# Patient Record
(demographics unavailable — no encounter records)

---

## 2024-10-14 NOTE — CONSULT LETTER
[Dear  ___] : Dear  [unfilled], [Consult Letter:] : I had the pleasure of evaluating your patient, [unfilled]. [Please see my note below.] : Please see my note below. [Consult Closing:] : Thank you very much for allowing me to participate in the care of this patient.  If you have any questions, please do not hesitate to contact me. [Sincerely,] : Sincerely, [DrRishi  ___] : Dr. NOONAN [FreeTextEntry2] : Julia Unger MD [FreeTextEntry3] : Richard B. Libman, MD, FRCPC  , Neurology  Co-Director, Stroke Center Professor of Neurology [facility] [employer] at [facility]

## 2024-10-14 NOTE — DISCUSSION/SUMMARY
[FreeTextEntry1] : 10/14/24.  Her neurologic exam shows a left homonymous hemianopia which is a result of her right MCA infarct; left shoulder decreased range of motion and weakness due to orthopedic pathology; mild-moderate bilateral iliopsoas weakness, right weaker than left, and most likely due to deconditioning.  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  She presented to [facility] on 8/15/24 with "confusion" and a left facial palsy due to a moderate-sized right MCA infarct, consistent with a mechanism of embolic stroke of undetermined source. She presented to [facility] again on 10/2/24 with gait instability; CT head and CTA head and neck were stable and unremarkable.  The cause of this episode of gait instability remains uncertain as there was no evidence of recurrent stroke.  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  As per Dr. Ritchie, her previous echocardiograms demonstrated  a PFO and he is considering a MADDIE.  Given that she has had recurrent pulmonary embolism, I understand that she will likely be treated with indefinite anticoagulation (Eliquis).  If this is the case, then searching further for occult AF or for a PFO would likely not be fruitful as it is unlikely that management will be changed.  She is taking Eliquis for recurrent pulmonary embolism. While being treated for pulmonary embolism, there is no role for concomitant aspirin. I recommended she discontinue the Aspirin (unclear if she was actually receiving aspirin while in rehab).  She was told in the past that there was a clinical suspicion of sleep apnea.  She endorses snoring and daytime fatigue, again raising concern for obstructive sleep apnea. I have requested a home sleep study.  She can follow up in 3 months with Dopplers. I hope she remains free of further serious trouble.

## 2024-10-14 NOTE — PHYSICAL EXAM
[General Appearance - Alert] : alert [General Appearance - In No Acute Distress] : in no acute distress [Oriented To Time, Place, And Person] : oriented to person, place, and time [Impaired Insight] : insight and judgment were intact [Sclera] : the sclera and conjunctiva were normal [Extraocular Movements] : extraocular movements were intact [Outer Ear] : the ears and nose were normal in appearance [Examination Of The Oral Cavity] : the lips and gums were normal [Neck Appearance] : the appearance of the neck was normal [Auscultation Breath Sounds / Voice Sounds] : lungs were clear to auscultation bilaterally [Heart Rate And Rhythm] : heart rate was normal and rhythm regular [Heart Sounds] : normal S1 and S2 [Heart Sounds Gallop] : no gallops [Murmurs] : no murmurs [Heart Sounds Pericardial Friction Rub] : no pericardial rub [Arterial Pulses Carotid] : carotid pulses were normal with no bruits [Edema] : there was no peripheral edema [Veins - Varicosity Changes] : there were no varicosital changes [No CVA Tenderness] : no ~M costovertebral angle tenderness [No Spinal Tenderness] : no spinal tenderness [Abnormal Walk] : normal gait [Nail Clubbing] : no clubbing  or cyanosis of the fingernails [Musculoskeletal - Swelling] : no joint swelling seen [Motor Tone] : muscle strength and tone were normal [Skin Color & Pigmentation] : normal skin color and pigmentation [Skin Turgor] : normal skin turgor [] : no rash [FreeTextEntry1] : Generally looked well. Mental status exam: Alert, oriented x3, speech fluent/prosodic without paraphasias; comprehension intact; memory and fund of knowledge intact. On cranial nerve exam, there was a left homonymous hemianopia; the remainder of cranial nerves II through XII were intact. On motor exam tone was normal. There was no drift. Fine finger movements are mildly slow and clumsy bilaterally.  On manual motor testing: Left deltoid just barely antigravity; iliopsoas: Right 4 -/5; left 4/5 and otherwise power was normal throughout. Reflexes were 3+ in the arms and at the knees, 2+ at the right Achilles and 1+-2+ at the left Achilles.  There was a left Babinski sign and the right plantar reflex was downgoing.  Coordination in the limbs was intact.  Gait showed mildly diminished stride length and mild postural instability, but she could walk without assistance.  Tandem gait and Romberg were not tested.  On sensory exam, there was subtly diminished proprioception at the left toes with mild hesitancy in her responses but no errors.  Other sensory modalities were intact.

## 2024-10-14 NOTE — DISCUSSION/SUMMARY
[FreeTextEntry1] : 10/14/24.  Her neurologic exam shows a left homonymous hemianopia which is a result of her right MCA infarct; left shoulder decreased range of motion and weakness due to orthopedic pathology; mild-moderate bilateral iliopsoas weakness, right weaker than left, and most likely due to deconditioning.  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  She presented to [facility] on 8/15/24 with "confusion" and a left facial palsy due to a moderate-sized right MCA infarct, consistent with a mechanism of embolic stroke of undetermined source. She presented to [facility] again on 10/2/24 with gait instability; CT head and CTA head and neck were stable and unremarkable.  The cause of this episode of gait instability remains uncertain as there was no evidence of recurrent stroke.  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  As per Dr. Ritchie, her previous echocardiograms demonstrated  a PFO and he is considering a MADDIE.  Given that she has had recurrent pulmonary embolism, I understand that she will likely be treated with indefinite anticoagulation (Eliquis).  If this is the case, then searching further for occult AF or for a PFO would likely not be fruitful as it is unlikely that management will be changed.  She is taking Eliquis for recurrent pulmonary embolism. While being treated for pulmonary embolism, there is no role for concomitant aspirin. I recommended she discontinue the Aspirin (unclear if she was actually receiving aspirin while in rehab).  She was told in the past that there was a clinical suspicion of sleep apnea.  She endorses snoring and daytime fatigue, again raising concern for obstructive sleep apnea. I have requested a home sleep study.  She can follow up in 3 months with Dopplers. I hope she remains free of further serious trouble.

## 2024-10-14 NOTE — HISTORY OF PRESENT ILLNESS
[FreeTextEntry1] : She is a 70 year old lady.  She presented to [facility] on 8/15/24 with "confusion" and a left facial palsy   Workup at [facility] includes: MRI brain 8/16/24: To my eye, there is a moderate-sized, subacute right MCA infarct involving the temporal lobe and a smaller component involving the popdtev-othsmodqh-dxeaylck region.with petechial hemorrhages. There are moderate or moderate-severe periventricular white matter hyperintensities of presumed vascular origin CTA head and neck 8/15/24: Unremarkable. TTE 8/16/24: EF 59%.  CT chest/abdomen/pelvis 8/18/24: Multiple pancreatic cystic lesions. Some lesions appear unchanged from prior exam 3/2024, another lesion is not well visualized on prior. The remainder of the chest abdomen and pelvis is unremarkable.  10/14/24.  She came to the office today accompanied by her son.  From the stroke standpoint, she had been stable since hospitalization, but she presented to [facility] on 10/2/24 with gait instability; CT head and CTA head and neck were stable. She is currently admitted to [facility] rehab.   PCP Dr. Julia Unger Physician at [facility] rehab: Dr. Villasenor

## 2024-10-14 NOTE — CONSULT LETTER
[Dear  ___] : Dear  [unfilled], [Consult Letter:] : I had the pleasure of evaluating your patient, [unfilled]. [Please see my note below.] : Please see my note below. [Consult Closing:] : Thank you very much for allowing me to participate in the care of this patient.  If you have any questions, please do not hesitate to contact me. [Sincerely,] : Sincerely, [DrRishi  ___] : Dr. NOONAN [FreeTextEntry2] : Julia Unger MD [FreeTextEntry3] : Richard B. Libman, MD, FRCPC  , Neurology  Co-Director, Stroke Center Professor of Neurology [facility] [employer] at [facility]

## 2024-10-14 NOTE — HISTORY OF PRESENT ILLNESS
[FreeTextEntry1] : She is a 70 year old lady.  She presented to [facility] on 8/15/24 with "confusion" and a left facial palsy   Workup at [facility] includes: MRI brain 8/16/24: To my eye, there is a moderate-sized, subacute right MCA infarct involving the temporal lobe and a smaller component involving the dcgrbcx-aapdisiqg-adznqifl region.with petechial hemorrhages. There are moderate or moderate-severe periventricular white matter hyperintensities of presumed vascular origin CTA head and neck 8/15/24: Unremarkable. TTE 8/16/24: EF 59%.  CT chest/abdomen/pelvis 8/18/24: Multiple pancreatic cystic lesions. Some lesions appear unchanged from prior exam 3/2024, another lesion is not well visualized on prior. The remainder of the chest abdomen and pelvis is unremarkable.  10/14/24.  She came to the office today accompanied by her son.  From the stroke standpoint, she had been stable since hospitalization, but she presented to [facility] on 10/2/24 with gait instability; CT head and CTA head and neck were stable. She is currently admitted to [facility] rehab.   PCP Dr. Julia Unger Physician at [facility] rehab: Dr. Villasenor

## 2024-10-14 NOTE — REVIEW OF SYSTEMS
[As Noted in HPI] : as noted in HPI [Negative] : Heme/Lymph [FreeTextEntry2] : snoring +daytime fatigue

## 2024-10-16 NOTE — ASSESSMENT
[FreeTextEntry1] : JERAD STAUFFER is a 70 year woman with PMH of anxiety/depression, orthostatic hypotension, hypothyroidism, PE (provoked from COVID - 10/2023), who presents for Hematology follow up of IgG kappa monoclonal gammopathy and anemia.  # Anemia: Labs from 4/22/24 showed chronic anemia (Hgb 9.9, baseline 9-11). Hgb today is 9.7. She previously had low iron (ferritin 13) in 1/2024. She is off iron supplementation as she was unable to tolerate it due to constipation. - Labs: CBC, ferritin, iron studies - We discussed the benefits/risks of IV iron (Feraheme) and obtained consent. Will plan to treat pending above studies. - Patient should return to clinic in 3 months  # IgG kappa monoclonal gammopathy: Noted on neurologic evaluation in 5/2023 to have a weak IgG kappa monoclonal protein (M-spike unable to quantitate) and mildly elevated kappa/lambda ratio (1.95). Normal immunoglobulin levels and urine PAVAN. No evidence of renal dysfunction or hypercalcemia. Her labs appears consistent with MGUS, which we will monitor every 6-12 months for progression to multiple myeloma. - Labs: CBC, CMP, serum PAVAN, SPEP, quantitative immunoglobulins, free light chain assay

## 2024-10-16 NOTE — PHYSICAL EXAM
[Ambulatory and capable of all self care but unable to carry out any work activities] : Status 2- Ambulatory and capable of all self care but unable to carry out any work activities. Up and about more than 50% of waking hours [Normal] : grossly intact [de-identified] : in wheelchair.

## 2024-10-16 NOTE — HISTORY OF PRESENT ILLNESS
[de-identified] : JERAD STAUFFER is a 70 year woman with PMH of anxiety/depression, orthostatic hypotension, hypothyroidism, PE (provoked from COVID - 10/2023), who presents for Hematology follow up of IgG kappa monoclonal gammopathy and iron deficiency anemia.  She established care with Neurology in 5/2023 for evaluation of subacute amnestic symptoms, frequent falls and urinary urgency. As part of her work up, she had paraprotein markers collected, which revealed a weak IgG kappa monoclonal protein (M-spike unable to quantitate) and mildly elevated kappa/lambda ratio (1.95). Normal immunoglobulin levels and urine PAVAN. She has mild normocytic anemia (Hgb 10.7), normal renal function, and normal calcium.  She established care in our office on 7/31/23 for evaluation of IgG kappa monoclonal gammopathy. Her labs were notable for IgG kappa monoclonal band (M-spike unable to quantitate), normal immunoglobulin levels, and a mildly elevated kappa/lambda ratio (2.51). No evidence of anemia, renal dysfunction, or hypercalcemia.  She was subsequently hospitalized in 8/2023 with COVID. She was started on prophylactic lovenox for 30 days due to an elevated D-dimer and concern for hypercoagulable state. She later presented to [facility] on 10/25/23 with a cough and was found to have segmental PE in the RUL and RLL. Lower extremity Dopplers were negative. D-dimer was elevated (1149). EBV serologies were consistent with recent infection or reactivation. Thought to be in the setting of recent infection and immobility. She completed at least 3 months of therapeutic Eliquis and is now off anticoagulation.  Interval History: - Since our last visit she was hospitalized 10/2/24 due to "falling off of her chair." she was found to have a PE and is now on indefinite Eliquis. She is now admitted to [facility]. - She presents today for follow up. She is complaining of intermittent dizziness with ambulation. She previously received IV Fereheme (last dose 8/15/24). She is following neurology.  Family History: - Mother with Alzheimer's - Father with Parkinson's - Maternal uncle with unspecified blood disorder - No history of cancer  Social History: - Lives with her son - Not currently working - Remote history of tobacco and alcohol use.

## 2024-10-16 NOTE — REVIEW OF SYSTEMS
[Recent Change In Weight] : ~T recent weight change [Constipation] : constipation [Diarrhea: Grade 0] : Diarrhea: Grade 0 [Joint Pain] : joint pain [Fever] : no fever [Chills] : no chills [Night Sweats] : no night sweats [Eye Pain] : no eye pain [Vision Problems] : no vision problems [Abdominal Pain] : no abdominal pain [Vomiting] : no vomiting [Joint Stiffness] : no joint stiffness [Muscle Pain] : no muscle pain [Muscle Weakness] : no muscle weakness [Confused] : no confusion [Dizziness] : no dizziness [Fainting] : no fainting [Difficulty Walking] : no difficulty walking [Negative] : Neurological [FreeTextEntry2] : dizziness [FreeTextEntry7] : intermittent constipation  [FreeTextEntry9] : chronic left shoulder pain and right knee pain

## 2025-01-03 NOTE — REVIEW OF SYSTEMS
[Feeling Tired] : feeling tired [Constipation] : constipation [Confused] : confusion [Dizziness] : dizziness [Difficulty Walking] : difficulty walking [Negative] : Heme/Lymph [Convulsions] : no convulsions [Fainting] : no fainting [Limb Weakness] : no limb weakness [FreeTextEntry9] : left shoullder limited range of motion

## 2025-01-03 NOTE — END OF VISIT
[] : Fellow [FreeTextEntry3] : Agree with note. Pt well known to me. Extremely complex. Discussion about amount of care, Needs 24 hour care, to prevent snf  longterm care. Pt at great risk. Hypercoaguable - PE, CVA with left hemineglect, also with memory impairment, orthostatic hypotension, hypothyroidism, severe OA bilateral shoulders, poor ambulation, help needed with all ADLs and recurrent UTIs. Family doing best to maintain pt in own home, tried living wiht son and family and was not safe and difficult for all.  SW referral, Trying to get accessoride. Have handicap parking. Need wheelchair.  Pt at risk for readmission.  May benefit house calls.

## 2025-01-03 NOTE — ASSESSMENT
[FreeTextEntry1] : Patient has mobility limitation that impairs ability to participate in MRADLs such as bathing, dressing and feeding that cannot be resolved with cane or walker and is at risk of falling without. Patient is willing to use chair as it will enable patient to perform MRADLs and patient will use on a regular basis. Patient has shas caretaker who is available and willing to provide assistance.

## 2025-01-03 NOTE — HISTORY OF PRESENT ILLNESS
[Two or more falls in past year] : Patient reported two or more falls in the past year [FreeTextEntry1] : Ms. Kitty Garnica 71 yo F with previous medical history of cognitive impairment, anemia of unknown cause ?MGUS ?ALAYNA (not on oral iron due to constipation), PE 2/2 COVID PNA, orthostatic hypotension, and hypothyroidism. Was recently discharged from [facility] after CVA (moderate right MCA infarct involving temporal lobe), also was recently treated for UTI 2 weeks ago.   States her urinary issues have resolved, no further foul-smelling urine or burning with urination. Also, aide states pt is no longer delirious, gait has been more stable since completing antibiotics.  Pt was discharged home from Rehab approx 1 month ago, son states he believes pt has fallen from bed at least 3 times which he has seen on cameras. Believe likely she has fallen more time, have removed box spring mattress to decrease height of fall. Aide visits in morning and able to help patient out of bed, otherwise pt unable to get out of bed on her own.   States PT course was completed appox 2 weeks ago and awaiting access a ride approval to continue PT/OT, however interested in Rehab at home PT/OT. Reports shoulder, knee pain that is somewhat controlled - asks about any creams to help with further pain relief.   Pt with cognitive issues (poor memory and poor awareness/insight and functional impairment now needs help with all ADLs -dressing, bathing (fell in shower), food prep, and toileting - using purewick at night and appreciating it. Has been hospitalized multiple times for falls due to orthostasis.

## 2025-01-03 NOTE — PHYSICAL EXAM
[Alert] : alert [No Acute Distress] : in no acute distress [Sclera] : the sclera and conjunctiva were normal [EOMI] : extraocular movements were intact [Normal Appearance] : the appearance of the neck was normal [Normal] : the pedal pulses are present, edema was not present [Bowel Sounds] : normal bowel sounds [Abdomen Tenderness] : non-tender [Abdomen Soft] : soft [No Spinal Tenderness] : no spinal tenderness [Motor Tone] : muscle strength and tone were normal [No Focal Deficits] : no focal deficits [Normal Affect] : the affect was normal [Normal Mood] : the mood was normal [Normal Gait] : abnormal gait [de-identified] : severe OA bilateral shoulders with limited rom bilaterally [de-identified] : left facial droop with nasolabial flattening ; left hemineglect

## 2025-01-03 NOTE — REASON FOR VISIT
[Follow-Up] : a follow-up visit [Formal Caregiver] : formal caregiver [Family Member] : family member [FreeTextEntry1] : Dizziness; gait instability, falls

## 2025-01-03 NOTE — PHYSICAL EXAM
[Alert] : alert [No Acute Distress] : in no acute distress [Sclera] : the sclera and conjunctiva were normal [EOMI] : extraocular movements were intact [Normal Appearance] : the appearance of the neck was normal [Normal] : the pedal pulses are present, edema was not present [Bowel Sounds] : normal bowel sounds [Abdomen Tenderness] : non-tender [Abdomen Soft] : soft [No Spinal Tenderness] : no spinal tenderness [Motor Tone] : muscle strength and tone were normal [No Focal Deficits] : no focal deficits [Normal Affect] : the affect was normal [Normal Mood] : the mood was normal [Normal Gait] : abnormal gait [de-identified] : severe OA bilateral shoulders with limited rom bilaterally [de-identified] : left facial droop with nasolabial flattening ; left hemineglect

## 2025-01-03 NOTE — HISTORY OF PRESENT ILLNESS
[Two or more falls in past year] : Patient reported two or more falls in the past year [FreeTextEntry1] : Ms. Kitty Garnica 69 yo F with previous medical history of cognitive impairment, anemia of unknown cause ?MGUS ?ALAYNA (not on oral iron due to constipation), PE 2/2 COVID PNA, orthostatic hypotension, and hypothyroidism. Was recently discharged from [facility] after CVA (moderate right MCA infarct involving temporal lobe), also was recently treated for UTI 2 weeks ago.   States her urinary issues have resolved, no further foul-smelling urine or burning with urination. Also, aide states pt is no longer delirious, gait has been more stable since completing antibiotics.  Pt was discharged home from Rehab approx 1 month ago, son states he believes pt has fallen from bed at least 3 times which he has seen on cameras. Believe likely she has fallen more time, have removed box spring mattress to decrease height of fall. Aide visits in morning and able to help patient out of bed, otherwise pt unable to get out of bed on her own.   States PT course was completed appox 2 weeks ago and awaiting access a ride approval to continue PT/OT, however interested in Rehab at home PT/OT. Reports shoulder, knee pain that is somewhat controlled - asks about any creams to help with further pain relief.   Pt with cognitive issues (poor memory and poor awareness/insight and functional impairment now needs help with all ADLs -dressing, bathing (fell in shower), food prep, and toileting - using purewick at night and appreciating it. Has been hospitalized multiple times for falls due to orthostasis.

## 2025-01-06 NOTE — DATA REVIEWED
[de-identified] : MRI; CTh and CTA h/n: significant atrophy, cortical and subcortical, with no real evidence of DESH, though there is LV dilatation, more in the back; callosal angle acute only in the back; some PV WMD.  Autonomic testing: low baroceptor reflexes-possible parasympathetic dysfunction-hypoactive. Also orthostatic issues, para+.  FDG-PET: c/w agPPA, may be NPH but very soft call.

## 2025-01-06 NOTE — ASSESSMENT
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 Some progression of overall picture, also in relation to stroke, UTIs and there are fluctuations, possibly in relation to BP fluctuations. Would repeat MRI to assess resolution of Stroke and r/o new issues. Can consider Myrbetriq for bladder hyperactivity and incontinence, which seems to be more relevant at night. Pt remains lucid, aware and reasoning well.  Diagnostic Impression: -CBD/PPA? -dysautonomia/Neuropathy -R MCS stroke  Plan: -MRI brain (@ [facility] due to PPM). I recommended to pursue mental and physical activity and to adhere to Mediterranean type of diet.

## 2025-01-06 NOTE — REASON FOR VISIT
[Follow-Up: _____] : a [unfilled] follow-up visit [Spouse] : spouse [Family Member] : family member [FreeTextEntry1] : Cognitive decline, Ataxia.

## 2025-01-06 NOTE — HISTORY OF PRESENT ILLNESS
[FreeTextEntry1] : NO 2023, s/p Rendesivir, on A/C now. COVID VACCINE FULL.  PPM 2019.  [id number]: -stroke in the Summer  - R MCA, plus PE, on A/C now - MRI brain not repeated, due for 10/2023, MRI was down @ [facility] at that time -follows with HemOnc Re MGUS and anemia, on Fe supplementation -recurring UTIs -gait is more unstable, more difficult to get off the chair -appetite: ok -sleep: ok, no major napping -mood: some irritability, at times depressed, due to her health issues -STM reduced, also not fully aware of her surroundings - may need more supervision going forward; overlapping periods of more confusion - partly related to recent UTI, BP variations.  HPI-Interval hx 2024: Since last seen: -skin BPS for neuropathy done, + for reduced fiber density, no amyloid deposition - suggestive of small fiber neuropathy -did not try Belsomra, due to cost, will try Ramelteon -no falls -appetite ok -motor stable.  HPI-Interval hx [id number]: Since last seen: -has developed numbness in R hand and R side of face. MRI brain  is ok, no changes to 2023, no new strokes, swi lesions etc. -sleep still not optimal, goes to bed very late, awakens at 7am, naps in the day, even long hours -still a few falls -appetite reduced.  2023: Pt here with son. 2023 had COVID PNA, with high D-Dimer, on Lovenox now, for another 2 weeks or so. No DVT. Takes Midodrine PRN and FlCort daily. ORTHO VS today:  Lay-131/89, 91 Sit-129/85, 76 Stand-113/78, 66.  EMG pending in November. Dx with MGUS - K chain. If neuropathy confirmed, it could be causative - possible autonomic component.  They describe instances where she would not "cooperate" with PT/OT, in one occasion, was on the floor, and was not coordinating enough to get back up; also issues with dressing.  Apathy is increasing. Trying to decrease/wean off Xanax and GBP. Sleep: tends to sleep in the day, reduced at night. Appetite: reduced, weight seems stable.  HPI: 68yo RH WW with hypothyroidism, anxiety/depression on SSRI, hypotension on Midodrine PRN and FlCt, here for concerns of NPH.  FHx: mother with dementia and father with PD.  PMH: 3/1/2023 fall down steps - ? lightheaded, fell backwards-->[place]-->[facility]. Prior, she had multiple falls.   2019, she had an episode of LOC-->PPM placed. She was Dx with OrthoHypotension, causing several falls.   In several occasions, she got lost driving. She has also gradually started forgetting events and things.  Pt was seen by Dr. Mcleod in the past, Dx with NPH.  -Memory: tends to forget recent things -Speech: no major issues -Orientation: episodes of getting lost -Praxis: overall ok, but lately, e.g., son noticed she has issues using utensils and chopsticks -Decision making/Executive fx/Multitasking: some changes prior to the fall in multitasking  -Sleep: sleeps more than usual, also in the day  -Appetite: ok, prefers sweets  -Motor symptoms: gait difficulty, balance issues, falls Re to hypotension; per son, she may have had balance issues all along; some postural tremor in L hand  -B/B: ok  -Psychiatric symptoms: Anxiety and depression, more stress now Re her health issues  -Functional status: Ellis Index of [place] in Activities of Daily Livin. Bathing/Showerin 2. Dressin 3. Toiletin 4. Transferrin - since the fall 5. Continence: 1 6: Feedin  TOTAL: 5  Nichols-Ovn Instrumental Activities of Daily Living: A. Ability to Use Telephone: 1 B. Shoppin-0 C. Food Preparation: 1-0  D. Housekeepin-0 E. Laundry: 1-0 F. Transportation: 1-0  G. Responsibility for Own Medications: 1  H: Ability to Handle Finances: 1  TOTAL: 3-8 - limited by her physical status s/p fall, but prior to the fall she was totally independent  CDR: 0.5  -Professional status: housewife  PCP and other physicians: -PCP: ANTHONY RICHARDSON -Neuro: Shani Grewal, [place]  Workup done: -CTh and CTA h/n: significant atrophy, cortical and subcortical, with no real evidence of DESH.

## 2025-01-06 NOTE — PHYSICAL EXAM
[FreeTextEntry1] : [id number]: Pt aware, partially oriented, fluent, good reasoning. Slight L HP, a bit more rigid than the R side, but no nish weakness. Can walk with one hand assistance, does not require support, just guidance for balance. Can march.  [General Appearance - Alert] : alert [General Appearance - In No Acute Distress] : in no acute distress [Oriented To Time, Place, And Person] : oriented to person, place, and time [Impaired Insight] : insight and judgment were intact [Affect] : the affect was normal [Person] : oriented to person [Place] : oriented to place [Time] : oriented to time [Remote Intact] : remote memory intact [Registration Intact] : recent registration memory intact [Concentration Intact] : normal concentrating ability [Visual Intact] : visual attention was ~T not ~L decreased [Naming Objects] : no difficulty naming common objects [Repeating Phrases] : no difficulty repeating a phrase [Writing A Sentence] : no difficulty writing a sentence [Fluency] : fluency intact [Comprehension] : comprehension intact [Reading] : reading intact [Current Events] : adequate knowledge of current events [Past History] : adequate knowledge of personal past history [Vocabulary] : adequate range of vocabulary [Total Score ___ / 30] : the patient achieved a score of [unfilled] /30 [Date / Time ___ / 5] : date / time [unfilled] / 5 [Place ___ / 5] : place [unfilled] / 5 [Registration ___ / 3] : registration [unfilled] / 3 [Serial Sevens ___/5] : serial sevens [unfilled] / 5 [Naming 2 Objects ___ / 2] : naming two objects [unfilled] / 2 [Repeating a Sentence ___ / 1] : repeating a sentence [unfilled] / 1 [Writing a Sentence ___ / 1] : write sentence [unfilled] / 1 [3-stage Verbal Command ___ / 3] : three-stage verbal command [unfilled] / 3 [Written Command ___ / 1] : written command [unfilled] / 1 [Copy a Design ___ / 1] : copy a design [unfilled] / 1 [Recall ___ / 3] : recall [unfilled] / 3 [Cranial Nerves Optic (II)] : visual acuity intact bilaterally,  visual fields full to confrontation, pupils equal round and reactive to light [Cranial Nerves Oculomotor (III)] : extraocular motion intact [Cranial Nerves Trigeminal (V)] : facial sensation intact symmetrically [Cranial Nerves Facial (VII)] : face symmetrical [Cranial Nerves Vestibulocochlear (VIII)] : hearing was intact bilaterally [Cranial Nerves Glossopharyngeal (IX)] : tongue and palate midline [Cranial Nerves Accessory (XI - Cranial And Spinal)] : head turning and shoulder shrug symmetric [Cranial Nerves Hypoglossal (XII)] : there was no tongue deviation with protrusion [Motor Strength] : muscle strength was normal in all four extremities [Motor Tone] : muscle tone was normal in all four extremities [No Muscle Atrophy] : normal bulk in all four extremities [Motor Handedness Right-Handed] : the patient is right hand dominant [Sensation Tactile Decrease] : light touch was intact [Romberg's Sign] : Romberg's sign was negtive [Balance] : balance was intact [Limited Balance] : the patient's balance was impaired [Past-pointing] : there was no past-pointing [Tremor] : no tremor present [2+] : Brachioradialis left 2+ [3+] : Ankle jerk left 3+ [Plantar Reflex Right Only] : normal on the right [Plantar Reflex Left Only] : normal on the left [FreeTextEntry4] : Mental Status Exam\par  Presidents: 4/5\par  Alternating Pattern: ok\par  Spiral: ok-some difficulty\par  Clock: 3/3\par  Repetition: ok\par  R/L discrimination on self and examiner: ok\par  Cross-line commands: ok\par  Praxis: \par  -Motor: ok\par  -Dynamic/Luria: ok\par  -Ideomotor/Imitation: ok \par  -Ideational/writing/closing-in: ok\par  -Dressing: ok. [FreeTextEntry5] : + PM R>L [FreeTextEntry6] : Mild postural tremor R hand. No overt strength deficit. Mild clumsiness in L hand [FreeTextEntry8] : cautioned gait, can march well; tandem with some difficulty; pivot in 3-4 steps. TUG 5". Pull: minor festination, but stops readily.  [Sclera] : the sclera and conjunctiva were normal [PERRL With Normal Accommodation] : pupils were equal in size, round, reactive to light, with normal accommodation [Extraocular Movements] : extraocular movements were intact [No APD] : no afferent pupillary defect [No IGGY] : no internuclear ophthalmoplegia [Full Visual Field] : full visual field [Outer Ear] : the ears and nose were normal in appearance [Neck Appearance] : the appearance of the neck was normal [Edema] : there was no peripheral edema [] : no rash

## 2025-01-23 NOTE — PHYSICAL EXAM
[Ambulatory and capable of all self care but unable to carry out any work activities] : Status 2- Ambulatory and capable of all self care but unable to carry out any work activities. Up and about more than 50% of waking hours [Normal] : affect appropriate [Capable of only limited self care, confined to bed or chair more than 50% of waking hours] : Status 3- Capable of only limited self care, confined to bed or chair more than 50% of waking hours [de-identified] : in wheelchair.

## 2025-01-23 NOTE — ASSESSMENT
[FreeTextEntry1] : JERAD STAUFFER is a 70 year woman with PMH of anxiety/depression, orthostatic hypotension, hypothyroidism, recurrent PE/CVA, who presents for Hematology follow up of iron deficiency anemia, recurrent PE/CVA, and IgG kappa monoclonal gammopathy.  # Anemia: She has chronic iron deficiency anemia. Unable to tolerate oral iron due to constipation. She has received intermittent IV iron infusions, most recently Feraheme x 1 dose (1/9/25). Hgb 9.5 today. - Labs: CBC, ferritin, iron studies - Will plan to give Feraheme x 1 dose (for total of 2 doses as planned) - Consider GI evaluation to look for possible source of bleeding - Patient should return to clinic in 3 months  # Recurrent PE/CVA: She hospitalized in 8/2023 with COVID. She was started on prophylactic lovenox for 30 days due to an elevated D-dimer and concern for hypercoagulable state. She later presented to [facility] on 10/25/23 with a cough and was found to have segmental PE in the RUL and RLL. Thought to be in the setting of recent infection and immobility. She completed at least 3 months of therapeutic Eliquis but developed recurrent clotting episodes, including CVA (8/2024) and PE (10/2024). She was found to have a cardiac shunt, possibly the etiology of her CVA, which has not yet been repaired. - Continue Eliquis 5 mg BID. Recommend indefinite anticoagulation as long as tolerating from a bleeding perspective. - Follow up with Cardiology to discuss repair of cardiac shunt.  # IgG kappa monoclonal gammopathy: Noted on neurologic evaluation in 5/2023 to have a weak IgG kappa monoclonal protein (M-spike unable to quantitate) and mildly elevated kappa/lambda ratio (1.95). Normal immunoglobulin levels and urine PAVAN. No evidence of renal dysfunction or hypercalcemia. Her labs appears consistent with MGUS, which we will monitor annually for progression to multiple myeloma. - Repeat MM markers annually, next in 10/2025

## 2025-01-23 NOTE — HISTORY OF PRESENT ILLNESS
[de-identified] : JERAD STAUFFER is a 70 year woman with PMH of anxiety/depression, orthostatic hypotension, hypothyroidism, recurrent PE/CVA, who presents for Hematology follow up of iron deficiency anemia, recurrent PE/CVA, and IgG kappa monoclonal gammopathy.  She established care with Neurology in 5/2023 for evaluation of subacute amnestic symptoms, frequent falls and urinary urgency. As part of her work up, she had paraprotein markers collected, which revealed a weak IgG kappa monoclonal protein (M-spike unable to quantitate) and mildly elevated kappa/lambda ratio (1.95). Normal immunoglobulin levels and urine PAVAN. She has mild normocytic anemia (Hgb 10.7), normal renal function, and normal calcium.  She established care in our office on 7/31/23 for evaluation of IgG kappa monoclonal gammopathy. Her labs were notable for IgG kappa monoclonal band (M-spike unable to quantitate), normal immunoglobulin levels, and a mildly elevated kappa/lambda ratio (2.51). No evidence of anemia, renal dysfunction, or hypercalcemia.  She was subsequently hospitalized in 8/2023 with COVID. She was started on prophylactic lovenox for 30 days due to an elevated D-dimer and concern for hypercoagulable state. She later presented to [facility] on 10/25/23 with a cough and was found to have segmental PE in the RUL and RLL. Lower extremity Dopplers were negative. D-dimer was elevated (1149). EBV serologies were consistent with recent infection or reactivation. Thought to be in the setting of recent infection and immobility. She completed at least 3 months of therapeutic Eliquis and stopped anticoagulation at rehab.   She was subsequently recommended to receive IV iron at her visit in 7/2024, but this was given inpatient as she was hospitalized for a CVA in 8/2024. She was found to have a cardiac shunt, possibly the etiology of her CVA. She later had a PE in 10/2024.  Interval History: - Since our last visit, she had been unable to receive IV iron due to multiple hospitalizations. She received Feraheme x 1 dose on 1/9/25, but her second dose was cancelled due to a hospitalization to rule out CVA.   - She presents today for follow up. She is complaining of intermittent dizziness with ambulation. She has persistent but improving anemia (Hgb 9.5).  Family History: - Mother with Alzheimer's - Father with Parkinson's - Maternal uncle with unspecified blood disorder - No history of cancer  Social History: - Lives with her son - Not currently working - Remote history of tobacco and alcohol use.

## 2025-01-23 NOTE — HISTORY OF PRESENT ILLNESS
[de-identified] : JERAD STAUFFER is a 70 year woman with PMH of anxiety/depression, orthostatic hypotension, hypothyroidism, recurrent PE/CVA, who presents for Hematology follow up of iron deficiency anemia, recurrent PE/CVA, and IgG kappa monoclonal gammopathy.  She established care with Neurology in 5/2023 for evaluation of subacute amnestic symptoms, frequent falls and urinary urgency. As part of her work up, she had paraprotein markers collected, which revealed a weak IgG kappa monoclonal protein (M-spike unable to quantitate) and mildly elevated kappa/lambda ratio (1.95). Normal immunoglobulin levels and urine PAVAN. She has mild normocytic anemia (Hgb 10.7), normal renal function, and normal calcium.  She established care in our office on 7/31/23 for evaluation of IgG kappa monoclonal gammopathy. Her labs were notable for IgG kappa monoclonal band (M-spike unable to quantitate), normal immunoglobulin levels, and a mildly elevated kappa/lambda ratio (2.51). No evidence of anemia, renal dysfunction, or hypercalcemia.  She was subsequently hospitalized in 8/2023 with COVID. She was started on prophylactic lovenox for 30 days due to an elevated D-dimer and concern for hypercoagulable state. She later presented to [facility] on 10/25/23 with a cough and was found to have segmental PE in the RUL and RLL. Lower extremity Dopplers were negative. D-dimer was elevated (1149). EBV serologies were consistent with recent infection or reactivation. Thought to be in the setting of recent infection and immobility. She completed at least 3 months of therapeutic Eliquis and stopped anticoagulation at rehab.   She was subsequently recommended to receive IV iron at her visit in 7/2024, but this was given inpatient as she was hospitalized for a CVA in 8/2024. She was found to have a cardiac shunt, possibly the etiology of her CVA. She later had a PE in 10/2024.  Interval History: - Since our last visit, she had been unable to receive IV iron due to multiple hospitalizations. She received Feraheme x 1 dose on 1/9/25, but her second dose was cancelled due to a hospitalization to rule out CVA.   - She presents today for follow up. She is complaining of intermittent dizziness with ambulation. She has persistent but improving anemia (Hgb 9.5).  Family History: - Mother with Alzheimer's - Father with Parkinson's - Maternal uncle with unspecified blood disorder - No history of cancer  Social History: - Lives with her son - Not currently working - Remote history of tobacco and alcohol use.

## 2025-01-23 NOTE — ASSESSMENT
[FreeTextEntry1] : JERAD STAUFFER is a 70 year woman with PMH of anxiety/depression, orthostatic hypotension, hypothyroidism, recurrent PE/CVA, who presents for Hematology follow up of iron deficiency anemia, recurrent PE/CVA, and IgG kappa monoclonal gammopathy.  # Anemia: She has chronic iron deficiency anemia. Unable to tolerate oral iron due to constipation. She has received intermittent IV iron infusions, most recently Feraheme x 1 dose (1/9/25). Hgb 9.5 today. - Labs: CBC, ferritin, iron studies - Will plan to give Feraheme x 1 dose (for total of 2 doses as planned) - Consider GI evaluation to look for possible source of bleeding - Patient should return to clinic in 3 months  # Recurrent PE/CVA: She hospitalized in 8/2023 with COVID. She was started on prophylactic lovenox for 30 days due to an elevated D-dimer and concern for hypercoagulable state. She later presented to [facility] on 10/25/23 with a cough and was found to have segmental PE in the RUL and RLL. Thought to be in the setting of recent infection and immobility. She completed at least 3 months of therapeutic Eliquis but developed recurrent clotting episodes, including CVA (8/2024) and PE (10/2024). She was found to have a cardiac shunt, possibly the etiology of her CVA, which has not yet been repaired. - Continue Eliquis 5 mg BID. Recommend indefinite anticoagulation as long as tolerating from a bleeding perspective. - Follow up with Cardiology to discuss repair of cardiac shunt.  # IgG kappa monoclonal gammopathy: Noted on neurologic evaluation in 5/2023 to have a weak IgG kappa monoclonal protein (M-spike unable to quantitate) and mildly elevated kappa/lambda ratio (1.95). Normal immunoglobulin levels and urine PAVAN. No evidence of renal dysfunction or hypercalcemia. Her labs appears consistent with MGUS, which we will monitor annually for progression to multiple myeloma. - Repeat MM markers annually, next in 10/2025

## 2025-01-23 NOTE — REVIEW OF SYSTEMS
[Recent Change In Weight] : ~T recent weight change [Constipation] : constipation [Diarrhea: Grade 0] : Diarrhea: Grade 0 [Joint Pain] : joint pain [Negative] : Allergic/Immunologic [Fever] : no fever [Chills] : no chills [Night Sweats] : no night sweats [Eye Pain] : no eye pain [Vision Problems] : no vision problems [Abdominal Pain] : no abdominal pain [Vomiting] : no vomiting [Joint Stiffness] : no joint stiffness [Muscle Pain] : no muscle pain [Muscle Weakness] : no muscle weakness [Confused] : no confusion [Dizziness] : no dizziness [Difficulty Walking] : no difficulty walking [Fainting] : no fainting [FreeTextEntry7] : intermittent constipation  [FreeTextEntry2] : dizziness [FreeTextEntry9] : chronic left shoulder pain and right knee pain

## 2025-01-23 NOTE — REASON FOR VISIT
[Follow-Up Visit] : a follow-up visit for [Friend] : friend [Formal Caregiver] : formal caregiver [FreeTextEntry2] : PE and iron deficiency anemia

## 2025-01-23 NOTE — REVIEW OF SYSTEMS
[Recent Change In Weight] : ~T recent weight change [Constipation] : constipation [Diarrhea: Grade 0] : Diarrhea: Grade 0 [Joint Pain] : joint pain [Negative] : Allergic/Immunologic [Fever] : no fever [Chills] : no chills [Night Sweats] : no night sweats [Eye Pain] : no eye pain [Vision Problems] : no vision problems [Abdominal Pain] : no abdominal pain [Vomiting] : no vomiting [Joint Stiffness] : no joint stiffness [Muscle Pain] : no muscle pain [Muscle Weakness] : no muscle weakness [Confused] : no confusion [Dizziness] : no dizziness [Fainting] : no fainting [Difficulty Walking] : no difficulty walking [FreeTextEntry2] : dizziness [FreeTextEntry7] : intermittent constipation  [FreeTextEntry9] : chronic left shoulder pain and right knee pain

## 2025-01-23 NOTE — PHYSICAL EXAM
[Ambulatory and capable of all self care but unable to carry out any work activities] : Status 2- Ambulatory and capable of all self care but unable to carry out any work activities. Up and about more than 50% of waking hours [Normal] : affect appropriate [Capable of only limited self care, confined to bed or chair more than 50% of waking hours] : Status 3- Capable of only limited self care, confined to bed or chair more than 50% of waking hours [de-identified] : in wheelchair.

## 2025-01-24 NOTE — DISCUSSION/SUMMARY
[FreeTextEntry1] : Kitty has a history of ataxia and memory loss, along with orthostatic hypotension. In 2023, she was admitted with a PE, post COVID. She has had falls in the shower in the setting of orthostasis. In 2024, she was admitted with facial droop/weakness, and found to have a subacute right MCA territorial infarct.  More recently in late 2024, she was readmitted with syncope in the setting of orthostatic hypotension.  Overall, she is fatigued and seems depressed.  She continues to have memory difficulties, and is following with a neurologist.  Her blood pressure is well-controlled today, though she continues to have symptoms of orthostasis.  She will continue midodrine and Florinef, and needs to increase her water intake and try compression socks.  I will speak to her neurologist about the use of Northera.  A recent echocardiogram suggested the presence of a PFO.  We discussed this finding in detail and although closure is a possibility given her CVA, I think we should remain conservative for now.  I would like her to stay on anticoagulation in the setting of PEs and a CVA, indefinitely.  If she proves unable to tolerate anticoagulation from a bleeding/anemia/fall standpoint, then we can reconsider a MADDIE and evaluation for closure.  If no issues, I will see her again in 3 months.  Her son knows to call at if any issues or concerns.  [EKG obtained to assist in diagnosis and management of assessed problem(s)] : EKG obtained to assist in diagnosis and management of assessed problem(s)

## 2025-01-30 NOTE — DISCUSSION/SUMMARY
[FreeTextEntry1] : Pt has fallen every day over past three days. Pt needs 24 hour care. Pt found on floor by aide. cameras n place. Maximum hours provided by medicaid in home. Trying to get more - needs 24 hours.  Holding eliquis. Discussed with son. Son does not want her to bleed. He knows the risks. have discussed what could happen - clot, cva.  Son asking if we can stop eliquis. I explained that his mother could be at a phase where we want to focus on what makes her feel better and symptom focused care.   Pt is DNR.DNI, lacks insight, cognitively impaired. Son needs support as care for his mother is very hard. Discussed need for SNF possibly. Goals are for keeping her as comfortable and dignified as possible.

## 2025-03-28 NOTE — HISTORY OF PRESENT ILLNESS
[FreeTextEntry1] : 70 year old female with PMH-cognitive impairment, anemia of unknown cause ?MGUS ?ALAYNA (not on oral iron due to constipation), PE 2/2 COVID PNA, orthostatic hypotension, and hypothyroidism. Per HHA and son, patient has been having increased episodes of agitation, where she doesnt want to listen to anything that's being said to help her. (refusing to shower, using inappropriate language towards HHA ). Cognitive decline and behavioral changes have been getting worse.  In speaking with aide separately and confirmed by son who has seen through cameras. .. pt more agitated, paranoid and accusatory. Pt throwing items. flinging arms, cursing at aide. Pt falling, not listening and led to not giving anticoagulation as son very anxious and" would rather she clot than bleed out."  [Two or more falls in past year] : Patient reported two or more falls in the past year [NO] : No [0] : 2) Feeling down, depressed, or hopeless: Not at all (0) [PHQ-2 Negative - No further assessment needed] : PHQ-2 Negative - No further assessment needed [[id number]] : 0 [Designated Healthcare Proxy] : Designated healthcare proxy [Name: ___] : Health Care Proxy's Name: [unfilled]  [Relationship: ___] : Relationship: [unfilled] [DNR] : DNR [DNI] : DNI [I will adhere to the patient's wishes.] : I will adhere to the patient's wishes. [FreeTextEntry4] : Pt care plan focusing more on quality and symptoms. Family trying medicaid. Family very tired and challegned to keep her safe.

## 2025-03-28 NOTE — ASSESSMENT
[FreeTextEntry1] : Ms. Kitty Garnica 71 yo F with previous medical history of cognitive impairment, anemia of unknown cause ?MGUS ?ALAYNA (not on oral iron due to constipation), PE 2/2 COVID PNA, orthostatic hypotension, and hypothyroidism.  Plan- CBC,CMP,A1C, Lipid Panel,B12, TSH,Vit D-Labs ordered Urinalysis with reflex culture-Urinal Hat with collecting cups provided to collect sample. Seroquel 25 mg bid ordered to help with agitation

## 2025-03-28 NOTE — PHYSICAL EXAM
[de-identified] : New pitting edema bilateral ankles [de-identified] : change in behavior per family and HHA

## 2025-03-28 NOTE — PHYSICAL EXAM
[de-identified] : New pitting edema bilateral ankles [de-identified] : change in behavior per family and HHA

## 2025-03-28 NOTE — HISTORY OF PRESENT ILLNESS
[FreeTextEntry1] : 70 year old female with PMH-cognitive impairment, anemia of unknown cause ?MGUS ?ALAYNA (not on oral iron due to constipation), PE 2/2 COVID PNA, orthostatic hypotension, and hypothyroidism. Per HHA and son, patient has been having increased episodes of agitation, where she doesnt want to listen to anything that's being said to help her. (refusing to shower, using inappropriate language towards HHA ). Cognitive decline and behavioral changes have been getting worse.  In speaking with aide separately and confirmed by son who has seen through cameras. .. pt more agitated, paranoid and accusatory. Pt throwing items. flinging arms, cursing at aide. Pt falling, not listening and led to not giving anticoagulation as son very anxious and" would rather she clot than bleed out."  [Two or more falls in past year] : Patient reported two or more falls in the past year [NO] : No [0] : 2) Feeling down, depressed, or hopeless: Not at all (0) [PHQ-2 Negative - No further assessment needed] : PHQ-2 Negative - No further assessment needed [[id number]] : 0 [Designated Healthcare Proxy] : Designated healthcare proxy [Name: ___] : Health Care Proxy's Name: [unfilled]  [Relationship: ___] : Relationship: [unfilled] [DNR] : DNR [DNI] : DNI [I will adhere to the patient's wishes.] : I will adhere to the patient's wishes. [FreeTextEntry4] : Pt care plan focusing more on quality and symptoms. Family trying medicaid. Family very tired and challegned to keep her safe.

## 2025-03-28 NOTE — REVIEW OF SYSTEMS
[Limb Weakness] : no limb weakness [Difficulty Walking] : no difficulty walking [FreeTextEntry9] : left shoullder limited range of motion

## 2025-04-23 NOTE — CONSULT LETTER
[Dear  ___] : Dear  [unfilled], [Consult Letter:] : I had the pleasure of evaluating your patient, [unfilled]. [Please see my note below.] : Please see my note below. [Consult Closing:] : Thank you very much for allowing me to participate in the care of this patient.  If you have any questions, please do not hesitate to contact me. [Sincerely,] : Sincerely, [FreeTextEntry2] : Julia Unger MD [FreeTextEntry3] : Yolanda Abad, FNP-BC

## 2025-04-23 NOTE — DISCUSSION/SUMMARY
[FreeTextEntry1] : 10/14/24.  Her neurologic exam shows a left homonymous hemianopia which is a result of her right MCA infarct; left shoulder decreased range of motion and weakness due to orthopedic pathology; mild-moderate bilateral iliopsoas weakness, right weaker than left, and most likely due to deconditioning.  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  She presented to [facility] on 8/15/24 with "confusion" and a left facial palsy due to a moderate-sized right MCA infarct, consistent with a mechanism of embolic stroke of undetermined source. She presented to [facility] again on 10/2/24 with gait instability; CT head and CTA head and neck were stable and unremarkable.  The cause of this episode of gait instability remains uncertain as there was no evidence of recurrent stroke.  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  As per Dr. Ritchie, her previous echocardiograms demonstrated  a PFO and he is considering a MADDIE.  Given that she has had recurrent pulmonary embolism, I understand that she will likely be treated with indefinite anticoagulation (Eliquis).  If this is the case, then searching further for occult AF or for a PFO would likely not be fruitful as it is unlikely that management will be changed.  She is taking Eliquis for recurrent pulmonary embolism. While being treated for pulmonary embolism, there is no role for concomitant aspirin. I recommended she discontinue the Aspirin (unclear if she was actually receiving aspirin while in rehab).  She was told in the past that there was a clinical suspicion of sleep apnea.  She endorses snoring and daytime fatigue, again raising concern for obstructive sleep apnea. I have requested a home sleep study.  4/23/25 - Overall she is neurologically stable, with a left homonymous hemianopia and diffuse weakness. -She was previously on Eliquis for recurrent pulmonary emboli.  Due to safety concerns from numerous falls, the Eliquis was discontinued.  I recommend starting aspirin for secondary stroke prevention.  Additionally, occult atrial fibrillation should be screened for, as a possible contributing mechanism to her stroke.  If her pacemaker has atrial leads, this should be interrogated regular and I defer to Dr. Ritchie for this.  If there are no atrial leads, then I recommend she have an implantable loop recorder placed. - Dopplers done today were unremarkable, with mild left ICA stenosis.   - She endorses snoring and daytime fatigue, again raising concern for obstructive sleep apnea. I have requested a home sleep study - She should benefit from physical and occupational therapy; referrals provided and sent to [facility] for  home services.    She can follow up in 6 months with repeat Dopplers. I hope she remains free of further serious trouble.

## 2025-04-23 NOTE — HISTORY OF PRESENT ILLNESS
"""IOP OS is not at goal. Recommend SLT OS to decrease IOP and prevent ocular damage and ""
[FreeTextEntry1] : She is a 70 year old lady.  She presented to [facility] on 8/15/24 with "confusion" and a left facial palsy   Workup at [facility] includes: MRI brain 8/16/24: To my eye, there is a moderate-sized, subacute right MCA infarct involving the temporal lobe and a smaller component involving the hilmiqk-aycflakpm-qckitwkx region.with petechial hemorrhages. There are moderate or moderate-severe periventricular white matter hyperintensities of presumed vascular origin CTA head and neck 8/15/24: Unremarkable. TTE 8/16/24: EF 59%.  CT chest/abdomen/pelvis 8/18/24: Multiple pancreatic cystic lesions. Some lesions appear unchanged from prior exam 3/2024, another lesion is not well visualized on prior. The remainder of the chest abdomen and pelvis is unremarkable.  10/14/24.  She came to the office today accompanied by her son.  From the stroke standpoint, she had been stable since hospitalization, but she presented to [facility] on 10/2/24 with gait instability; CT head and CTA head and neck were stable. [facility]   4/23/25. She presents to the office today with a home health attendant, and her son was on the phone.  She has been experiencing autonomic symptoms, due to corticobasal syndrome.  She was in the hospital in January 2025 with fluctuating symptoms, due to low blood pressure; she was worked up for stroke, and workup was unrevealing.  She has been falling a lot, and Dr. Unger therefore discontinued her Eliquis, for safety concerns.  She denies any new focal neurologic symptoms.  She is not currently participating in physical or occupational therapy.    Carotid Doppler 4/23/2025: No hemodynamically significant right ICA stenosis.  Mild, approximately 45%, left ICA stenosis.   TCD 4/23/25: Normal.     PCP Dr. Julia Unger Physician at [facility] rehab: Dr. Villasenor

## 2025-04-28 NOTE — REASON FOR VISIT
[Follow-Up Visit] : a follow-up visit for [Formal Caregiver] : formal caregiver [FreeTextEntry2] : PE and iron deficiency anemia

## 2025-04-28 NOTE — ASSESSMENT
[FreeTextEntry1] : JERAD STAUFFER is a 71 year woman with PMH of anxiety/depression, orthostatic hypotension, hypothyroidism, recurrent PE/CVA, who presents for Hematology follow up of iron deficiency anemia, recurrent PE/CVA, and IgG kappa monoclonal gammopathy.  # Anemia: She has chronic iron deficiency anemia. Unable to tolerate oral iron due to constipation. She has received intermittent IV iron infusions, most recently Feraheme x 1 dose (1/9/25 and 1/27/25). Hgb 10.2 today. - Labs: CBC, ferritin, iron studies - Consider GI evaluation to look for possible source of bleeding  # Recurrent PE/CVA: She hospitalized in 8/2023 with COVID. She was started on prophylactic lovenox for 30 days due to an elevated D-dimer and concern for hypercoagulable state. She later presented to [facility] on 10/25/23 with a cough and was found to have segmental PE in the RUL and RLL. Thought to be in the setting of recent infection and immobility. She completed at least 3 months of therapeutic Eliquis but developed recurrent clotting episodes, including CVA (8/2024) and PE (10/2024). She was found to have a cardiac shunt, possibly the etiology of her CVA, which has not yet been repaired. - Recommend indefinite anticoagulation with Eliquis 5 mg BID, as long as tolerating from a bleeding perspective. However, of note, per chart review Eliquis was discontinued by son and ASA 81mg daily started. As per son "due to increased falls he would rather have her clot then bleed."  - Continue ASA 81 mg daily - Follow up with Cardiology to discuss repair of cardiac shunt.  # IgG kappa monoclonal gammopathy: Noted on neurologic evaluation in 5/2023 to have a weak IgG kappa monoclonal protein (M-spike unable to quantitate) and mildly elevated kappa/lambda ratio (1.95). Normal immunoglobulin levels and urine PAVAN. No evidence of renal dysfunction or hypercalcemia. Her labs appears consistent with MGUS, which we will monitor annually for progression to multiple myeloma. - Repeat MM markers annually, next in 10/2025  Patient being seen as per physician's primary plan of care.

## 2025-04-28 NOTE — REVIEW OF SYSTEMS
[Diarrhea: Grade 0] : Diarrhea: Grade 0 [Negative] : Allergic/Immunologic [Fatigue] : fatigue [FreeTextEntry2] : dizziness [FreeTextEntry7] : intermittent constipation  [FreeTextEntry9] : chronic left shoulder pain and right knee pain

## 2025-04-28 NOTE — HISTORY OF PRESENT ILLNESS
[de-identified] : JERAD STAUFFER is a 71 year woman with PMH of anxiety/depression, orthostatic hypotension, hypothyroidism, recurrent PE/CVA, who presents for Hematology follow up of iron deficiency anemia, recurrent PE/CVA, and IgG kappa monoclonal gammopathy.  She established care with Neurology in 5/2023 for evaluation of subacute amnestic symptoms, frequent falls and urinary urgency. As part of her work up, she had paraprotein markers collected, which revealed a weak IgG kappa monoclonal protein (M-spike unable to quantitate) and mildly elevated kappa/lambda ratio (1.95). Normal immunoglobulin levels and urine PAVAN. She has mild normocytic anemia (Hgb 10.7), normal renal function, and normal calcium.  She established care in our office on 7/31/23 for evaluation of IgG kappa monoclonal gammopathy. Her labs were notable for IgG kappa monoclonal band (M-spike unable to quantitate), normal immunoglobulin levels, and a mildly elevated kappa/lambda ratio (2.51). No evidence of anemia, renal dysfunction, or hypercalcemia.  She was subsequently hospitalized in 8/2023 with COVID. She was started on prophylactic lovenox for 30 days due to an elevated D-dimer and concern for hypercoagulable state. She later presented to [facility] on 10/25/23 with a cough and was found to have segmental PE in the RUL and RLL. Lower extremity Dopplers were negative. D-dimer was elevated (1149). EBV serologies were consistent with recent infection or reactivation. Thought to be in the setting of recent infection and immobility. She completed at least 3 months of therapeutic Eliquis and stopped anticoagulation at rehab.   She was subsequently recommended to receive IV iron at her visit in 7/2024, but this was given inpatient as she was hospitalized for a CVA in 8/2024. She was found to have a cardiac shunt, possibly the etiology of her CVA. She later had a PE in 10/2024.  Interval History: - Since our last visit, she received Feraheme x 1 dose on 1/9/25, but her second dose was postponed due to a hospitalization to rule out CVA, which she received 1/27/25. - She presents today for follow up. She is complaining of intermittent dizziness with ambulation, fatigue, and constipation. She has persistent but improving anemia (Hgb 10.2). She recently had a UTI and is now taking Bactrim. Of note, per chart review Eliquis was discontinued by son and ASA 81mg daily started. As per son "due to increased falls he would rather have her clot then bleed."   Family History: - Mother with Alzheimer's - Father with Parkinson's - Maternal uncle with unspecified blood disorder - No history of cancer  Social History: - Lives with her son - Not currently working - Remote history of tobacco and alcohol use.

## 2025-04-30 NOTE — HISTORY OF PRESENT ILLNESS
[FreeTextEntry1] : Kitty is a 70-year-old female here for follow up.  I last saw her in 9/24.  Her past medical history is notable for cervical fusion, appendectomy, cataract extraction, pacemaker implantation for bradycardia, orthostatic hypotension, hyperlipidemia, reflux, anemia, hypothyroidism, and vertigo.  She is taking a baby aspirin, fludrocortisone 0.1, and was on midodrine 5 twice daily.  She has a history of memory difficulties over the last few years, and the tendency to fall.  In March of this year, she fell down a flight of steps.  She was evaluated at [facility] and there was suggestion/suspicion of a right vertebral artery dissection.  She has followed up with a neurosurgeon, and neurologist.  She she is being evaluated for normal pressure hydrocephalus, and they are ruling out cervical myelopathy, given her history of a cervical fusion. She had an echocardiogram in March in 2023 at [facility] which demonstrated a normal LV, with an EF of 60 to 65%, mild to moderate mitral regurgitation, mild to moderate tricuspid regurgitation and a pulmonary pressure of 50. She is a former smoker.  She had COVID in August of 2023 and presented with a worsening cough and dyspnea on October 25, 2023.  CTPA was positive for a right upper lobe/left lower lobe subsegmental PE.  She was treated with anticoagulation.  An echocardiogram was unremarkable.  She initially had a fever, which was presumably viral in origin.  She was discharged home on aspirin 81, Eliquis, Florinef 0.1 and midodrine 5 3 times daily.  She had a fall in the shower in late March 2024. She broke some ribs. She was found to be significantly orthostatic. Her PPM showed no events. She was admitted for 3 days and eventually discharged to rehab. Her midodrine had been increased to 10 tid, and florinef 01. continued. She is off Eliquis.  In 8/24, she was noted to be more confused with a facial droop. She was sent to the ER and eventually diagnosed with a Evolving subacute right MCA territorial infarct with petechial hemorrhages on MRI. Interrogation of her ppm showed no AF. Echocardiogram with normal LV function.   She was admitted with a fall in January 2025, and possible syncope.  Her symptoms were to be believed from orthostatic hypotension.  Her MRI showed no acute or new CVA and her pacemaker suggested no arrhythmia.  Her echo noted normal LV function, though confirmed the presence of a PFO  I last saw her in January 2025. She is doing ok.  She continues to have some dizziness with positional change, though has not passed out. She has no chest pain, difficulty breathing, or palpitations.  She reports fatigue, and that everything is a struggle. She has edema bilaterally, right versus left. She does have recurrent falls.

## 2025-04-30 NOTE — DISCUSSION/SUMMARY
[FreeTextEntry1] : Kitty has a history of ataxia and memory loss, along with orthostatic hypotension. In 2023, she was admitted with a PE, post COVID. She has had falls in the shower in the setting of orthostasis. In 2024, she was admitted with facial droop/weakness, and found to have a subacute right MCA territorial infarct.  More recently in late 2024, she was readmitted with syncope in the setting of orthostatic hypotension.  Overall, she is fatigued and seems depressed.  She continues to have memory difficulties, and is following with a neurologist.  Her blood pressure is well-controlled today, though she continues to have symptoms of orthostasis.  She will continue midodrine and Florinef. She has swelling today, and needs a doppler to r/o DVT (she has a history of PEs). If negative, we may try a limited course of low dose diuretic and compression socks. The florinef may also be contributing.  A recent echocardiogram suggested the presence of a PFO.  We discussed this finding in detail and although closure is a possibility given her CVA, I think we should remain conservative for now.  I would like her to stay on anticoagulation in the setting of PEs and a CVA, indefinitely. Unfortunately, she had multiple falls in a week, and her AC was stopped.  If no issues, I will see her again in 3 months.  Her son knows to call at if any issues or concerns.  [EKG obtained to assist in diagnosis and management of assessed problem(s)] : EKG obtained to assist in diagnosis and management of assessed problem(s)

## 2025-05-21 NOTE — PHYSICAL EXAM
accepted [FreeTextEntry1] : [id number]: Exam not substantially different since 1/2025. Pt aware, partially oriented, fluent, good reasoning. Slight L HP, a bit more rigid than the R side, but no nish weakness - improved since last seen. Can walk with one hand assistance, does not require support, just guidance for balance. Can march.  [General Appearance - Alert] : alert [General Appearance - In No Acute Distress] : in no acute distress [Oriented To Time, Place, And Person] : oriented to person, place, and time [Impaired Insight] : insight and judgment were intact [Affect] : the affect was normal [Person] : oriented to person [Place] : oriented to place [Time] : oriented to time [Remote Intact] : remote memory intact [Registration Intact] : recent registration memory intact [Concentration Intact] : normal concentrating ability [Visual Intact] : visual attention was ~T not ~L decreased [Naming Objects] : no difficulty naming common objects [Repeating Phrases] : no difficulty repeating a phrase [Writing A Sentence] : no difficulty writing a sentence [Fluency] : fluency intact [Comprehension] : comprehension intact [Reading] : reading intact [Current Events] : adequate knowledge of current events [Past History] : adequate knowledge of personal past history [Vocabulary] : adequate range of vocabulary [Total Score ___ / 30] : the patient achieved a score of [unfilled] /30 [Date / Time ___ / 5] : date / time [unfilled] / 5 [Place ___ / 5] : place [unfilled] / 5 [Registration ___ / 3] : registration [unfilled] / 3 [Serial Sevens ___/5] : serial sevens [unfilled] / 5 [Naming 2 Objects ___ / 2] : naming two objects [unfilled] / 2 [Repeating a Sentence ___ / 1] : repeating a sentence [unfilled] / 1 [Writing a Sentence ___ / 1] : write sentence [unfilled] / 1 [3-stage Verbal Command ___ / 3] : three-stage verbal command [unfilled] / 3 [Written Command ___ / 1] : written command [unfilled] / 1 [Copy a Design ___ / 1] : copy a design [unfilled] / 1 [Recall ___ / 3] : recall [unfilled] / 3 [Cranial Nerves Optic (II)] : visual acuity intact bilaterally,  visual fields full to confrontation, pupils equal round and reactive to light [Cranial Nerves Oculomotor (III)] : extraocular motion intact [Cranial Nerves Trigeminal (V)] : facial sensation intact symmetrically [Cranial Nerves Facial (VII)] : face symmetrical [Cranial Nerves Vestibulocochlear (VIII)] : hearing was intact bilaterally [Cranial Nerves Glossopharyngeal (IX)] : tongue and palate midline [Cranial Nerves Accessory (XI - Cranial And Spinal)] : head turning and shoulder shrug symmetric [Cranial Nerves Hypoglossal (XII)] : there was no tongue deviation with protrusion [Motor Tone] : muscle tone was normal in all four extremities [Motor Strength] : muscle strength was normal in all four extremities [No Muscle Atrophy] : normal bulk in all four extremities [Motor Handedness Right-Handed] : the patient is right hand dominant [Sensation Tactile Decrease] : light touch was intact [Romberg's Sign] : Romberg's sign was negtive [Balance] : balance was intact [Limited Balance] : the patient's balance was impaired [Past-pointing] : there was no past-pointing [Tremor] : no tremor present [2+] : Brachioradialis left 2+ [3+] : Ankle jerk left 3+ [Plantar Reflex Right Only] : normal on the right [Plantar Reflex Left Only] : normal on the left [FreeTextEntry4] : Mental Status Exam\par  Presidents: 4/5\par  Alternating Pattern: ok\par  Spiral: ok-some difficulty\par  Clock: 3/3\par  Repetition: ok\par  R/L discrimination on self and examiner: ok\par  Cross-line commands: ok\par  Praxis: \par  -Motor: ok\par  -Dynamic/Luria: ok\par  -Ideomotor/Imitation: ok \par  -Ideational/writing/closing-in: ok\par  -Dressing: ok. [FreeTextEntry5] : + PM R>L [FreeTextEntry6] : Mild postural tremor R hand. No overt strength deficit. Mild clumsiness in L hand [FreeTextEntry8] : cautioned gait, can march well; tandem with some difficulty; pivot in 3-4 steps. TUG 5". Pull: minor festination, but stops readily.  [Sclera] : the sclera and conjunctiva were normal [PERRL With Normal Accommodation] : pupils were equal in size, round, reactive to light, with normal accommodation [Extraocular Movements] : extraocular movements were intact [No APD] : no afferent pupillary defect [No IGGY] : no internuclear ophthalmoplegia [Full Visual Field] : full visual field [Outer Ear] : the ears and nose were normal in appearance [Neck Appearance] : the appearance of the neck was normal [Edema] : there was no peripheral edema [] : no rash

## 2025-05-21 NOTE — HISTORY OF PRESENT ILLNESS
[FreeTextEntry1] : NO 2023, s/p Rendesivir, on A/C now. COVID VACCINE FULL.  PPM 2019.   [id number]: Pt had USDVT done 10 weeks ago, found to have R DVT-->started on Eliquis (5mg BID?). Appetite very good. Sleep seems to be ok, some napping. Motor: very hesitant to walk, unclear how much the apathy/fear component is important. Seems to be aware of all her medical issues and recent events.   2025: -stroke in the Summer  - R MCA, plus PE, on A/C now - MRI brain not repeated, due for 10/2023, MRI was down @ [facility] at that time -follows with HemOnc Re MGUS and anemia, on Fe supplementation -recurring UTIs -gait is more unstable, more difficult to get off the chair -appetite: ok -sleep: ok, no major napping -mood: some irritability, at times depressed, due to her health issues -STM reduced, also not fully aware of her surroundings - may need more supervision going forward; overlapping periods of more confusion - partly related to recent UTI, BP variations.  HPI-Interval hx [id number]: Since last seen: -skin BPS for neuropathy done, + for reduced fiber density, no amyloid deposition - suggestive of small fiber neuropathy -did not try Belsomra, due to cost, will try Ramelteon -no falls -appetite ok -motor stable.  HPI-Interval hx [id number]: Since last seen: -has developed numbness in R hand and R side of face. MRI brain  is ok, no changes to 2023, no new strokes, swi lesions etc. -sleep still not optimal, goes to bed very late, awakens at 7am, naps in the day, even long hours -still a few falls -appetite reduced.  2023: Pt here with son. 2023 had COVID PNA, with high D-Dimer, on Lovenox now, for another 2 weeks or so. No DVT. Takes Midodrine PRN and FlCort daily. ORTHO VS today:  Lay-131/89, 91 Sit-129/85, 76 Stand-113/78, 66.  EMG pending in November. Dx with MGUS - K chain. If neuropathy confirmed, it could be causative - possible autonomic component.  They describe instances where she would not "cooperate" with PT/OT, in one occasion, was on the floor, and was not coordinating enough to get back up; also issues with dressing.  Apathy is increasing. Trying to decrease/wean off Xanax and GBP. Sleep: tends to sleep in the day, reduced at night. Appetite: reduced, weight seems stable.  HPI: 68yo RH WW with hypothyroidism, anxiety/depression on SSRI, hypotension on Midodrine PRN and FlCt, here for concerns of NPH.  FHx: mother with dementia and father with PD.  PMH: 3/1/2023 fall down steps - ? lightheaded, fell backwards-->[place]-->[facility]. Prior, she had multiple falls.   2019, she had an episode of LOC-->PPM placed. She was Dx with OrthoHypotension, causing several falls.   In several occasions, she got lost driving. She has also gradually started forgetting events and things.  Pt was seen by Dr. Mcleod in the past, Dx with NPH.  -Memory: tends to forget recent things -Speech: no major issues -Orientation: episodes of getting lost -Praxis: overall ok, but lately, e.g., son noticed she has issues using utensils and chopsticks -Decision making/Executive fx/Multitasking: some changes prior to the fall in multitasking  -Sleep: sleeps more than usual, also in the day  -Appetite: ok, prefers sweets  -Motor symptoms: gait difficulty, balance issues, falls Re to hypotension; per son, she may have had balance issues all along; some postural tremor in L hand  -B/B: ok  -Psychiatric symptoms: Anxiety and depression, more stress now Re her health issues  -Functional status: Ellis Index of [place] in Activities of Daily Livin. Bathing/Showerin 2. Dressin 3. Toiletin 4. Transferrin - since the fall 5. Continence: 1 6: Feedin  TOTAL: 5  [place] Instrumental Activities of Daily Living: A. Ability to Use Telephone: 1 B. Shoppin-0 C. Food Preparation: 1-0  D. Housekeepin-0 E. Laundry: 1-0 F. Transportation: 1-0  G. Responsibility for Own Medications: 1  H: Ability to Handle Finances: 1  TOTAL: 3-8 - limited by her physical status s/p fall, but prior to the fall she was totally independent  CDR: 0.5  -Professional status: housewife  PCP and other physicians: -PCP: ANTHONY RICHARDSON -Neuro: Shani Grewal [facility]  Workup done: -CTh and CTA h/n: significant atrophy, cortical and subcortical, with no real evidence of DESH.

## 2025-05-21 NOTE — ASSESSMENT
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 Recent DVT, now back on A/C. Recently started on Seroquel for some agitation and irritability, doing ok. Pt remains lucid, aware and reasoning well. MRI 1/2025 stable, with evolution of prior stroke. Needs more motivation to walk and move, she can do extremely well. Will not change Rx now, allow Seroquel to work.  Diagnostic Impression: -CBD/PPA? -dysautonomia/Neuropathy -R MCS stroke  Plan: -recommend to move and walk more -no other chage in Rx now. I recommended to pursue mental and physical activity and to adhere to Mediterranean type of diet.

## 2025-05-21 NOTE — DATA REVIEWED
[de-identified] : MRI; CTh and CTA h/n: significant atrophy, cortical and subcortical, with no real evidence of DESH, though there is LV dilatation, more in the back; callosal angle acute only in the back; some PV WMD.  Autonomic testing: low baroceptor reflexes-possible parasympathetic dysfunction-hypoactive. Also orthostatic issues, para+.  FDG-PET: c/w agPPA, may be NPH but very soft call.

## 2025-06-14 NOTE — REVIEW OF SYSTEMS
[Feeling Tired] : feeling tired [Eyesight Problems] : eyesight problems [Confused] : confusion [Difficulty Walking] : difficulty walking [As Noted in HPI] : as noted in HPI [Negative] : Heme/Lymph [Fever] : no fever [Chills] : no chills [Feeling Poorly] : not feeling poorly [Eye Pain] : no eye pain [Red Eyes] : eyes not red [Chest Pain] : no chest pain [Palpitations] : no palpitations [Convulsions] : no convulsions [Dizziness] : no dizziness [Fainting] : no fainting [Limb Weakness] : no limb weakness [Suicidal] : not suicidal [Sleep Disturbances] : no sleep disturbances [de-identified] : agitation

## 2025-06-14 NOTE — REVIEW OF SYSTEMS
[Feeling Tired] : feeling tired [Eyesight Problems] : eyesight problems [Confused] : confusion [Difficulty Walking] : difficulty walking [As Noted in HPI] : as noted in HPI [Negative] : Heme/Lymph [Fever] : no fever [Chills] : no chills [Feeling Poorly] : not feeling poorly [Eye Pain] : no eye pain [Red Eyes] : eyes not red [Chest Pain] : no chest pain [Palpitations] : no palpitations [Convulsions] : no convulsions [Dizziness] : no dizziness [Fainting] : no fainting [Limb Weakness] : no limb weakness [Suicidal] : not suicidal [Sleep Disturbances] : no sleep disturbances [de-identified] : agitation

## 2025-06-14 NOTE — HISTORY OF PRESENT ILLNESS
[Completely Dependent] : Completely dependent. [NO] : No [Little interest or pleasure doing things] : 1) Little interest or pleasure doing things [Feeling down, depressed, or hopeless] : 2) Feeling down, depressed, or hopeless [0] : 2) Feeling down, depressed, or hopeless: Not at all (0) [PHQ-2 Negative - No further assessment needed] : PHQ-2 Negative - No further assessment needed [Memory Lapses Or Loss] : stable memory impairment [Patient Observed To Be Agitated] : stable agitation [Hostility Toward Caregivers] : denies aggression [Sleep Disturbances] : denies sleep disturbances [] : denies wandering [Fixed Beliefs Contradicted By Reality (Delusions)] : denies delusions [FreeTextEntry1] : 70 yo female who has had clotting issues (PE, DVTs) was on eliquis but was falling repeatedly, so eliquis held but then another DVT occurred, so now pt back on eliquis. Pt has not fallen in a few weeks.  Pt has bilateral edema.  Pt denies pain. Pt had an episode of diarrhea at hair salon the other day, no symptoms, no warning.  Pt has not had diarrhea today or yesterday. No new foods. Pt is not using laxatives or stool softeners. No fever, chills.    Pt has bilateral shoulder pain due to severe OA.   Pt is not ambulating, using wheelchair today, walker at home, but has 24 hour care. Son is caregiver, HHA lives in.   Pt has a vision impairment from past cva - hemianopsia (left sided), she keeps her held tilted towards left to look out at me from right side.  Pt is having some agitation in evenings. Aide managing. We discussed behaviour reorinetation and steps to avoid triggering pt.  Pt with orthostatic hypotension on fluronef and midodrine, has adjusted dosages bc of High BP readings.  [de-identified] : 6 [[id number]] : 0 [de-identified] : Pt with poor insight into her abilities and disabilities. needs 24 hour care.

## 2025-06-14 NOTE — HISTORY OF PRESENT ILLNESS
[Completely Dependent] : Completely dependent. [NO] : No [Little interest or pleasure doing things] : 1) Little interest or pleasure doing things [Feeling down, depressed, or hopeless] : 2) Feeling down, depressed, or hopeless [0] : 2) Feeling down, depressed, or hopeless: Not at all (0) [PHQ-2 Negative - No further assessment needed] : PHQ-2 Negative - No further assessment needed [Memory Lapses Or Loss] : stable memory impairment [Patient Observed To Be Agitated] : stable agitation [Hostility Toward Caregivers] : denies aggression [Sleep Disturbances] : denies sleep disturbances [] : denies wandering [Fixed Beliefs Contradicted By Reality (Delusions)] : denies delusions [FreeTextEntry1] : 70 yo female who has had clotting issues (PE, DVTs) was on eliquis but was falling repeatedly, so eliquis held but then another DVT occurred, so now pt back on eliquis. Pt has not fallen in a few weeks.  Pt has bilateral edema.  Pt denies pain. Pt had an episode of diarrhea at hair salon the other day, no symptoms, no warning.  Pt has not had diarrhea today or yesterday. No new foods. Pt is not using laxatives or stool softeners. No fever, chills.    Pt has bilateral shoulder pain due to severe OA.   Pt is not ambulating, using wheelchair today, walker at home, but has 24 hour care. Son is caregiver, HHA lives in.   Pt has a vision impairment from past cva - hemianopsia (left sided), she keeps her held tilted towards left to look out at me from right side.  Pt is having some agitation in evenings. Aide managing. We discussed behaviour reorinetation and steps to avoid triggering pt.  Pt with orthostatic hypotension on fluronef and midodrine, has adjusted dosages bc of High BP readings.  [de-identified] : 6 [[id number]] : 0 [de-identified] : Pt with poor insight into her abilities and disabilities. needs 24 hour care.

## 2025-06-14 NOTE — PHYSICAL EXAM
[Alert] : alert [No Acute Distress] : in no acute distress [PERRL] : pupils were equal in size, round, and reactive to light [Normal Outer Ear/Nose] : the ears and nose were normal in appearance [Normal Appearance] : the appearance of the neck was normal [Supple] : the neck was supple [No Respiratory Distress] : no respiratory distress [No Acc Muscle Use] : no accessory muscle use [Respiration, Rhythm And Depth] : normal respiratory rhythm and effort [Auscultation Breath Sounds / Voice Sounds] : lungs were clear to auscultation bilaterally [Heart Rate And Rhythm] : heart rate was normal and rhythm regular [Bowel Sounds] : normal bowel sounds [Abdomen Tenderness] : non-tender [Abdomen Soft] : soft [Normal] : no spinal tenderness [No CVA Tenderness] : no CVA  tenderness [No Spinal Tenderness] : no spinal tenderness [Normal Color / Pigmentation] : normal skin color and pigmentation [Normal Turgor] : normal skin turgor [No Focal Deficits] : no focal deficits [Normal Affect] : the affect was normal [Normal Mood] : the mood was normal

## 2025-06-14 NOTE — SOCIAL HISTORY
[Alone] : lives alone [With caregiver] : lives with caregiver [Apartment] : in an apartment [FreeTextEntry1] : Nasir  [FreeTextEntry4] : son

## 2025-06-14 NOTE — ASSESSMENT
[FreeTextEntry1] : Pt needs 24-hour care and supervision bc of needing help with all ADLs Ellis score = 6. Pt with history of falls, needs help with medications.   1) Mentation   - Mood - stable, has agitation - Memory - poor cognition, poor insight, has had safety and functional issues. Pt falls when she walks bc she does not use walker, needs supervision which she gets from aide. Has avoided falling for a few weeks now.  2) Medications - lasix ordered for pt to use 20 mg a day, for about 3 days and see if it helps bilateral edema. Ambulate with support, elevate legs. Continue seroquel 25 mg bid, helpful to her agiation, paranoia.   3) Mobility - walker, grab bars, wheelchair for long distances.   4) What Matters Most: Pt has poor insight and relies on son for most decisions and help. Care plan in alignment with her goals to live in her own apartment.  Eliquis has been discussed at length over time bc of risks and benefits... as of now benefits outweighs the risks.

## 2025-07-02 NOTE — HISTORY OF PRESENT ILLNESS
[de-identified] : JERAD STAUFFER is a 71 year woman with PMH of anxiety/depression, orthostatic hypotension, hypothyroidism, recurrent PE/CVA, who presents for Hematology follow up of iron deficiency anemia, recurrent PE/CVA, and IgG kappa monoclonal gammopathy.  She established care with Neurology in 5/2023 for evaluation of subacute amnestic symptoms, frequent falls and urinary urgency. As part of her work up, she had paraprotein markers collected, which revealed a weak IgG kappa monoclonal protein (M-spike unable to quantitate) and mildly elevated kappa/lambda ratio (1.95). Normal immunoglobulin levels and urine PAVAN. She has mild normocytic anemia (Hgb 10.7), normal renal function, and normal calcium.  She established care in our office on 7/31/23 for evaluation of IgG kappa monoclonal gammopathy. Her labs were notable for IgG kappa monoclonal band (M-spike unable to quantitate), normal immunoglobulin levels, and a mildly elevated kappa/lambda ratio (2.51). No evidence of anemia, renal dysfunction, or hypercalcemia.  She was subsequently hospitalized in 8/2023 with COVID. She was started on prophylactic lovenox for 30 days due to an elevated D-dimer and concern for hypercoagulable state. She later presented to [facility] on 10/25/23 with a cough and was found to have segmental PE in the RUL and RLL. Lower extremity Dopplers were negative. D-dimer was elevated (1149). EBV serologies were consistent with recent infection or reactivation. Thought to be in the setting of recent infection and immobility. She completed at least 3 months of therapeutic Eliquis and stopped anticoagulation at rehab.   She was subsequently recommended to receive IV iron at her visit in 7/2024, but this was given inpatient as she was hospitalized for a CVA in 8/2024. She was found to have a cardiac shunt, possibly the etiology of her CVA. She later had a PE in 10/2024.  Interval History: - Since our last visit, she developed another DVT (acute right popliteal) in 5/2025 after stopping Eliquis. She restarted Eliquis, and her Hgb dropped about a month later from 9.7 to 7.9. She was admitted to [facility] for GI evaluation, but colonoscopy was deferred as she tested positive for Covid. She received Venofer x 3 doses (6/20/25 - 6/22/25), and her Hgb improved to 9.3 on discharge. - She presents today for follow up. She has persistent but improving anemia (Hgb 9.7).  Family History: - Mother with Alzheimer's - Father with Parkinson's - Maternal uncle with unspecified blood disorder - No history of cancer  Social History: - Lives with her son - Not currently working - Remote history of tobacco and alcohol use.

## 2025-07-02 NOTE — ASSESSMENT
[FreeTextEntry1] : JERAD STAUFFER is a 71 year woman with PMH of anxiety/depression, orthostatic hypotension, hypothyroidism, recurrent PE/CVA, who presents for Hematology follow up of iron deficiency anemia, recurrent PE/CVA, and IgG kappa monoclonal gammopathy.  # Anemia: She has chronic iron deficiency anemia. Unable to tolerate oral iron due to constipation. She has received intermittent IV iron infusions, most recently Venofer x 3 doses (6/2025). Hgb 9.7 today.  - Labs: CBC, ferritin, iron studies - No need for further IV iron at this time - GI evaluation to look for possible source of bleeding - Patient should return to clinic in 3 months  # Recurrent PE/CVA: She hospitalized in 8/2023 with COVID. She was started on prophylactic lovenox for 30 days due to an elevated D-dimer and concern for hypercoagulable state. She later presented to [facility] on 10/25/23 with a cough and was found to have segmental PE in the RUL and RLL. Thought to be in the setting of recent infection and immobility. She completed at least 3 months of therapeutic Eliquis but developed recurrent clotting episodes, including CVA (8/2024) and PE (10/2024). She was found to have a cardiac shunt, possibly the etiology of her CVA, which has not yet been repaired. - Continue Eliquis 5 mg BID. Recommend indefinite anticoagulation as long as tolerating from a bleeding perspective.  # IgG kappa monoclonal gammopathy: Noted on neurologic evaluation in 5/2023 to have a weak IgG kappa monoclonal protein (M-spike unable to quantitate) and mildly elevated kappa/lambda ratio (1.95). Normal immunoglobulin levels and urine PAVAN. No evidence of renal dysfunction or hypercalcemia. Her labs appears consistent with MGUS, which we will monitor annually for progression to multiple myeloma. - Repeat MM markers annually, next in 6/2026